# Patient Record
Sex: FEMALE | Race: WHITE
[De-identification: names, ages, dates, MRNs, and addresses within clinical notes are randomized per-mention and may not be internally consistent; named-entity substitution may affect disease eponyms.]

---

## 2017-05-23 ENCOUNTER — HOSPITAL ENCOUNTER (EMERGENCY)
Dept: HOSPITAL 11 - JP.ED | Age: 32
Discharge: HOME | End: 2017-05-23
Payer: MEDICAID

## 2017-05-23 VITALS — SYSTOLIC BLOOD PRESSURE: 128 MMHG | DIASTOLIC BLOOD PRESSURE: 74 MMHG

## 2017-05-23 DIAGNOSIS — Z88.0: ICD-10-CM

## 2017-05-23 DIAGNOSIS — Z88.8: ICD-10-CM

## 2017-05-23 DIAGNOSIS — E10.649: Primary | ICD-10-CM

## 2017-05-23 DIAGNOSIS — Z98.890: ICD-10-CM

## 2017-05-23 DIAGNOSIS — Z79.4: ICD-10-CM

## 2017-05-23 DIAGNOSIS — Z88.1: ICD-10-CM

## 2017-05-23 PROCEDURE — 82962 GLUCOSE BLOOD TEST: CPT

## 2017-05-23 PROCEDURE — 99284 EMERGENCY DEPT VISIT MOD MDM: CPT

## 2017-05-23 PROCEDURE — 96374 THER/PROPH/DIAG INJ IV PUSH: CPT

## 2017-05-23 NOTE — EDM.PDOC
ED HPI GENERAL MEDICAL PROBLEM





- General


Chief Complaint: Diabetic Complaint


Stated Complaint: MEDICAL VIA NORTH


Time Seen by Provider: 17 01:28


Source of Information: Reports: Patient, EMS, Old Records, RN Notes Reviewed


History Limitations: Reports: Other (amnestic for the event)





- History of Present Illness


INITIAL COMMENTS - FREE TEXT/NARRATIVE: 





EMS arrival, EMS was called by her six-year-old son





Chief complaint: Low blood sugar, seizure


HPI: 32-year-old female with type 1 diabetes since aged 10


And became much more difficult to control after her childbirth about 6 years 

ago.


Additionally for a while she was out of insurance she couldn't test her sugars 

she had no strips and then did not have a machine


EMS as been called replaced several times for symptoms of a low blood sugar





Lives with her son and , but  was out of town working as he is 

frequently


Her son knows to call 911 when mom becomes unresponsive





Her son did notify EMS tonight


They report several visits to the home because of low sugars.  However the 

first time the blood sugar registered "low" without giving a number.


A. treated with the usual probe which is glucagon 1 mg IM and oral glucose 

solution.  However while they were there she had a generalized grand mal 

seizure lasting at least 3 minutes.


She does not room for the event





She reports she has had seizures previously


She states that she's become unaware of when she feels that her sugars will


She also states it diabetes become such secondary to to her that sometimes she 

has given himself 2 injections of insulin.  Now she does have a glucose meter 

and has started recording in the last 2 days





She will be followed up by her physician who will adjust her insulin doses 

based on her readings


She currently takes Lantus 30 units at bedtime and 5 units of short-acting 

insulin for carbohydrate equivalent.





Feels well currently.


No recent cough cold infection or fever





- Related Data


 Allergies











Allergy/AdvReac Type Severity Reaction Status Date / Time


 


aspirin Allergy  Hives Verified 17 19:15


 


azithromycin [From Zithromax] Allergy  Anaphylactic Verified 17 19:15





   Shock  


 


cefaclor [From Ceclor] Allergy  Hives Verified 17 19:15


 


Penicillins Allergy  Hives Verified 17 19:15











Home Meds: 


 Home Meds





Insulin Aspart [NovoLOG] 5 unit SUBCUT WITHMEALSANDBED 17 [History]


Insulin Glarg,Human.Rec.Analog [LantUS Solostar] 30 unit SQ BEDTIME 17 [

History]











Past Medical History


HEENT History: Reports: Impaired Vision


OB/GYN History: Reports: Pregnancy, Other (See Below)


Other OB/BYN History: merena IUD


Endocrine/Metabolic History: Reports: Diabetes, Type I





- Infectious Disease History


Infectious Disease History: Reports: Chicken Pox





- Past Surgical History


Female  Surgical History: Reports:  Section





Social & Family History





- Tobacco Use


Smoking Status *Q: Never Smoker





- Caffeine Use


Caffeine Use: Reports: Coffee, Soda





- Recreational Drug Use


Recreational Drug Use: No





ED ROS GENERAL





- Review of Systems


Review Of Systems: See Below


HEENT: Reports: No Symptoms


Respiratory: Reports: No Symptoms


Cardiovascular: Reports: Syncope.  Denies: Chest Pain


Endocrine: Reports: No Symptoms


GI/Abdominal: Reports: No Symptoms


: Reports: No Symptoms


Musculoskeletal: Reports: No Symptoms


Skin: Reports: No Symptoms


Neurological: Reports: Seizure


Psychiatric: Reports: No Symptoms





ED EXAM GENERAL NO PERIP PULSE





- Physical Exam


Exam: See Below


Exam Limited By: No Limitations


General Appearance: Alert, No Apparent Distress, Other (mild tachycardia and 

elevation of systolic blood pressure otherwise appears well)


Eye Exam: Bilateral Eye: Normal Inspection


Ears: Normal External Exam


Nose: Normal Inspection


Throat/Mouth: Normal Oropharynx, Other (there is a bite on the left edge of the 

tongue)


Neck: Normal Inspection.  No: Lymphadenopathy (R), Lymphadenopathy (L)


Respiratory/Chest: No Respiratory Distress, Lungs Clear, No Accessory Muscle Use


Cardiovascular: Normal Peripheral Pulses, Regular Rate, Rhythm, Tachycardia


Neurological: Alert, Oriented, No Motor/Sensory Deficits





Course





- Vital Signs


Last Recorded V/S: 


 Last Vital Signs











Temp  37.1 C   17 01:10


 


Pulse  114 H  17 02:21


 


Resp  18   17 01:10


 


BP  128/74   17 04:46


 


Pulse Ox  93 L  17 02:21














- Orders/Labs/Meds


Orders: 


 Active Orders 24 hr











 Category Date Time Status


 


 Sodium Chloride 0.9% [Saline Flush] Med  17 03:45 Active





 10 ml FLUSH ASDIRECTED PRN   


 


 Saline Lock Insert [OM.PC] Stat Oth  17 03:45 Ordered








 Medication Orders





Sodium Chloride (Saline Flush)  10 ml FLUSH ASDIRECTED PRN


   PRN Reason: Keep Vein Open


   Last Admin: 17 04:43  Dose: 10 ml








Meds: 


Medications











Generic Name Dose Route Start Last Admin





  Trade Name Evin  PRN Reason Stop Dose Admin


 


Sodium Chloride  10 ml  17 03:45  17 04:43





  Saline Flush  FLUSH   10 ml





  ASDIRECTED PRN   Administration





  Keep Vein Open   














Discontinued Medications














Generic Name Dose Route Start Last Admin





  Trade Name Evin  PRN Reason Stop Dose Admin


 


Dextrose/Water  25 ml  17 03:32  17 03:36





  Dextrose 50% In Water  IVPUSH  17 03:33  25 ml





  ONETIME ONE   Administration


 


Dextrose/Water  25 ml  17 04:39  17 04:43





  Dextrose 50% In Water  IVPUSH  17 04:40  25 ml





  ONETIME ONE   Administration














- Re-Assessments/Exams


Free Text/Narrative Re-Assessment/Exam: 





17 01:48


32-year-old female with type 1 diabetes with recurrent hypoglycemic episodes 

recently


Next hyperglycemic episode was accompanied by grand mal seizure


Feels better at the present time


However her initial POC glucose was 66


Will be fed, glucose will be rechecked


17 03:44





03.30 blood sugar remains well below 100 and is trending downwards


below 50, IV started


25 mL D50 W. IV


17 07:02





1/2 amp was repeated at glucose 71





07.00


glucose 83


discharge home


Reduce Lantus To 25 units daily


Followup with primary care as soon as possible








Departure





- Departure


Time of Disposition: 07:14


Disposition: Home, Self-Care 01


Condition: good


Clinical Impression: 


 Hypoglycemia





Diabetes mellitus type 1


Qualifiers:


 Diabetes mellitus complication status: with hypoglycemia Diabetes mellitus 

complication detail: without coma Qualified Code(s): E10.649 - Type 1 diabetes 

mellitus with hypoglycemia without coma








- Discharge Information


Instructions:  Type 1 Diabetes Mellitus, Adult


Referrals: 


PCP,None [Primary Care Provider] - 


Forms:  ED Department Discharge


Additional Instructions: 


Reduce your Lantus from 30 units to 25 units daily


Check your blood sugars more regularly until you find the correct insulin 

dosing. 


You may need to reduce your fast acting insulin as well





Make an appointment with your Dr. if surgery can make sure you keep records of 

your blood sugars





- My Orders


Last 24 Hours: 


My Active Orders





17 03:45


Sodium Chloride 0.9% [Saline Flush]   10 ml FLUSH ASDIRECTED PRN 


Saline Lock Insert [OM.PC] Stat 














- Assessment/Plan


Last 24 Hours: 


My Active Orders





17 03:45


Sodium Chloride 0.9% [Saline Flush]   10 ml FLUSH ASDIRECTED PRN 


Saline Lock Insert [OM.PC] Stat

## 2017-08-15 ENCOUNTER — HOSPITAL ENCOUNTER (EMERGENCY)
Dept: HOSPITAL 11 - JP.ED | Age: 32
Discharge: HOME | End: 2017-08-15
Payer: MEDICAID

## 2017-08-15 VITALS — DIASTOLIC BLOOD PRESSURE: 65 MMHG | SYSTOLIC BLOOD PRESSURE: 133 MMHG

## 2017-08-15 DIAGNOSIS — Z88.1: ICD-10-CM

## 2017-08-15 DIAGNOSIS — Z98.890: ICD-10-CM

## 2017-08-15 DIAGNOSIS — Z88.0: ICD-10-CM

## 2017-08-15 DIAGNOSIS — Z88.8: ICD-10-CM

## 2017-08-15 DIAGNOSIS — E10.649: Primary | ICD-10-CM

## 2017-08-15 DIAGNOSIS — Z79.4: ICD-10-CM

## 2017-08-15 NOTE — EDM.PDOC
ED HPI GENERAL MEDICAL PROBLEM





- General


Chief Complaint: Diabetic Complaint


Stated Complaint: MEDICAL VIA NORTH


Time Seen by Provider: 08/15/17 10:00


Source of Information: Reports: Patient, EMS Notes Reviewed, Other (pt had a 

sugar in he 30s when the ambulance arrived. She was bearly responsive. )


History Limitations: Reports: No Limitations





- History of Present Illness


INITIAL COMMENTS - FREE TEXT/NARRATIVE: 





pt is a type 1 diabetic and had a low sugar this am.  She had a very active day 

yesterday. Before eating super last nite she had a 46 bs. She ate supper and at 

about 7 thirty she did take her usual  Lantus  insulin, 30 units. She thinks 

she should have cut back on the lantus since she had a 46 bs prior to supper.  

She was brought recently one other time with a hypoglycemic episode in the 

middle of the nite. 


Onset: Today


Duration: Hour(s):


Location: Reports: Other (hypoglycemia)


Associated Symptoms: Reports: No Other Symptoms





- Related Data


 Allergies











Allergy/AdvReac Type Severity Reaction Status Date / Time


 


aspirin Allergy  Hives Verified 08/15/17 10:07


 


azithromycin [From Zithromax] Allergy  Anaphylactic Verified 08/15/17 10:07





   Shock  


 


cefaclor [From Ceclor] Allergy  Hives Verified 08/15/17 10:07


 


Penicillins Allergy  Hives Verified 08/15/17 10:07











Home Meds: 


 Home Meds





Insulin Aspart [NovoLOG] 5 unit SUBCUT WITHMEALSANDBED 17 [History]


Insulin Glarg,Human.Rec.Analog [LantUS Solostar] 30 unit SQ BEDTIME 17 [

History]











Past Medical History


HEENT History: Reports: Impaired Vision


OB/GYN History: Reports: Pregnancy, Other (See Below)


Other OB/BYN History: merena IUD


Endocrine/Metabolic History: Reports: Diabetes, Type I





- Infectious Disease History


Infectious Disease History: Reports: Chicken Pox





- Past Surgical History


Female  Surgical History: Reports:  Section





Social & Family History





- Tobacco Use


Smoking Status *Q: Never Smoker





- Caffeine Use


Caffeine Use: Reports: Coffee, Soda





- Recreational Drug Use


Recreational Drug Use: No





ED ROS GENERAL





- Review of Systems


Review Of Systems: See Below


Constitutional: Reports: No Symptoms


HEENT: Reports: No Symptoms


Respiratory: Reports: No Symptoms


Cardiovascular: Reports: No Symptoms


Endocrine: Reports: No Symptoms, Low Glucose


GI/Abdominal: Reports: No Symptoms


: Reports: No Symptoms


Musculoskeletal: Reports: No Symptoms


Skin: Reports: No Symptoms





ED EXAM GENERAL NO PERIP PULSE





- Physical Exam


Exam: See Below


Text/Narrative:: 





pt was found unresponsive by her son this am. She had a low sugar prior to 

having supper last noite. 


Exam Limited By: No Limitations


General Appearance: Alert, Anxious


Ears: Normal TMs


Nose: Normal Inspection


Throat/Mouth: Normal Inspection


Head: Atraumatic


Neck: Normal Inspection


Respiratory/Chest: No Respiratory Distress


Cardiovascular: Regular Rate, Rhythm


GI/Abdominal: Soft, Non-Tender


 (Female) Exam: Deferred


Rectal (Female) Exam: Deferred


Back Exam: Normal Inspection


Extremities: Normal Inspection


Neurological: Alert, Oriented, Normal Cognition





Course





- Vital Signs


Last Recorded V/S: 


 Last Vital Signs











Temp  37.1 C   08/15/17 15:02


 


Pulse  102 H  08/15/17 15:02


 


Resp  16   08/15/17 15:02


 


BP  133/65   08/15/17 15:02


 


Pulse Ox  100   08/15/17 15:02














- Orders/Labs/Meds


Labs: 


 Laboratory Tests











  08/15/17 08/15/17 08/15/17 Range/Units





  10:09 10:09 11:54 


 


WBC  7.8    (4.5-11.0)  K/uL


 


RBC  4.52    (3.30-5.50)  M/uL


 


Hgb  13.4  D    (12.0-15.0)  g/dL


 


Hct  41.5    (36.0-48.0)  %


 


MCV  92    (80-98)  fL


 


MCH  30    (27-31)  pg


 


MCHC  32    (32-36)  %


 


Plt Count  180    (150-400)  K/uL


 


Neut % (Auto)  66    (36-66)  %


 


Lymph % (Auto)  24    (24-44)  %


 


Mono % (Auto)  9 H    (2-6)  %


 


Eos % (Auto)  1 L    (2-4)  %


 


Baso % (Auto)  0    (0-1)  %


 


Sodium   139 L   (140-148)  mmol/L


 


Potassium   3.4 L   (3.6-5.2)  mmol/L


 


Chloride   106   (100-108)  mmol/L


 


Carbon Dioxide   28  D   (21-32)  mmol/L


 


Anion Gap   8.4   (5.0-14.0)  mmol/L


 


BUN   11   (7-18)  mg/dL


 


Creatinine   0.9   (0.6-1.0)  mg/dL


 


Est Cr Clr Drug Dosing   81.50   mL/min


 


Estimated GFR (MDRD)   > 60   (>60)  


 


Glucose   112 H   ()  mg/dL


 


Calcium   8.4 L   (8.5-10.1)  mg/dL


 


Total Bilirubin   0.2  D   (0.2-1.0)  mg/dL


 


AST   18   (15-37)  U/L


 


ALT   27   (12-78)  U/L


 


Alkaline Phosphatase   111   ()  U/L


 


Total Protein   7.3   (6.4-8.2)  g/dL


 


Albumin   3.3 L   (3.4-5.0)  g/dL


 


Globulin   4.0 H   (2.3-3.5)  g/dL


 


Albumin/Globulin Ratio   0.8 L   (1.2-2.2)  


 


Urine Color    Yellow  


 


Urine Appearance    Clear  


 


Urine pH    6.0  (4.5-8.0)  


 


Ur Specific Gravity    1.015  (1.008-1.030)  


 


Urine Protein    Trace  (NEGATIVE)  mg/dL


 


Urine Glucose (UA)    1000 H  (NEGATIVE)  mg/dL


 


Urine Ketones    Negative  (NEGATIVE)  mg/dL


 


Urine Occult Blood    Negative  (NEGATIVE)  


 


Urine Nitrite    Negative  (NEGATIVE)  


 


Urine Bilirubin    Negative  (NEGATIVE)  


 


Urine Urobilinogen    Normal  (NORMAL)  mg/dL


 


Ur Leukocyte Esterase    Large  (NEGATIVE)  


 


Urine RBC    0-5  (0-5)  


 


Urine WBC    0-5  (0-5)  


 


Ur Epithelial Cells    Many  


 


Amorphous Sediment    Not seen  


 


Urine Bacteria    Few  


 


Urine Mucus    Not seen  











Meds: 


Medications














Discontinued Medications














Generic Name Dose Route Start Last Admin





  Trade Name Evin  PRN Reason Stop Dose Admin


 


Albuterol  2.5 mg  08/15/17 12:23  





  Proventil Neb Soln  NEB  08/15/17 12:24  





  ONETIME ONE   


 


Dextrose/Sodium Chloride  1,000 mls @ 200 mls/hr  08/15/17 12:15  08/15/17 12:24





  Dextrose 5%-1/2 Ns  IV   200 mls/hr





  ASDIRECTED FANTASMA   Administration


 


Lorazepam  0.5 mg  08/15/17 13:10  





  Ativan  PO  08/15/17 13:11  





  ONETIME ONE   














- Re-Assessments/Exams


Free Text/Narrative Re-Assessment/Exam: 





08/15/17 12:09


pt arrived after having a hypoglycemic episode at home. Her sugar was 100 on 

arrival. she was fed right away and her sugar still stayed on the low side. She 

has had  2 glasses of oranfge juice with sugar and she is a 69 at this point. 

Will start an iv at 250 and watch her sugars. 


08/15/17 16:04


pt is now maintaining her bs in the 169 range. 





Departure





- Departure


Time of Disposition: 16:04


Disposition: Home, Self-Care 01


Condition: Fair


Clinical Impression: 


 Hypoglycemia, Type 1 diabetes








- Discharge Information


Instructions:  Type 1 Diabetes Mellitus, Adult, Hypoglycemia, Easy-to-Read


Referrals: 


PCP,None [Primary Care Provider] - 


Forms:  ED Department Discharge


Care Plan Goals: 


push fluids, If sugars remain under 200 tonight  give only 10 units of lantus 

tonight. After that cut the Lantus to 20-25 units at nite.  Rtc if further 

problems.  See regular provider in [place] in the next few days.

## 2018-03-08 ENCOUNTER — HOSPITAL ENCOUNTER (EMERGENCY)
Dept: HOSPITAL 11 - JP.ED | Age: 33
Discharge: HOME | End: 2018-03-08
Payer: MEDICAID

## 2018-03-08 VITALS — SYSTOLIC BLOOD PRESSURE: 173 MMHG | DIASTOLIC BLOOD PRESSURE: 94 MMHG

## 2018-03-08 DIAGNOSIS — E11.9: ICD-10-CM

## 2018-03-08 DIAGNOSIS — Z79.4: ICD-10-CM

## 2018-03-08 DIAGNOSIS — Z79.899: ICD-10-CM

## 2018-03-08 DIAGNOSIS — J02.0: Primary | ICD-10-CM

## 2018-03-08 DIAGNOSIS — Z88.1: ICD-10-CM

## 2018-03-08 DIAGNOSIS — Z88.0: ICD-10-CM

## 2018-03-08 DIAGNOSIS — Z88.8: ICD-10-CM

## 2018-03-08 PROCEDURE — 87430 STREP A AG IA: CPT

## 2018-03-08 PROCEDURE — 99283 EMERGENCY DEPT VISIT LOW MDM: CPT

## 2018-03-08 NOTE — EDM.PDOC
ED HPI GENERAL MEDICAL PROBLEM





- General


Chief Complaint: ENT Problem


Stated Complaint: STREP?


Time Seen by Provider: 18 04:49


Source of Information: Reports: Patient, Family, RN Notes Reviewed


History Limitations: Reports: No Limitations





- History of Present Illness


INITIAL COMMENTS - FREE TEXT/NARRATIVE: 





32-year-old female presents emergency department today complaint of sore throat

, she states she's had sore throat for about a day and half as well as fever 

does have a history of recurrent sore throat


  ** Throat


Pain Score (Numeric/FACES): 6





- Related Data


 Allergies











Allergy/AdvReac Type Severity Reaction Status Date / Time


 


aspirin Allergy  Hives Verified 18 04:27


 


azithromycin [From Zithromax] Allergy  Anaphylactic Verified 18 04:27





   Shock  


 


cefaclor [From Ceclor] Allergy  Hives Verified 18 04:27


 


Penicillins Allergy  Hives Verified 18 04:27











Home Meds: 


 Home Meds





Insulin Aspart [NovoLOG] 5 unit SUBCUT WITHMEALSANDBED 17 [History]


Insulin Glarg,Human.Rec.Analog [LantUS Solostar] 60 unit SQ BEDTIME 17 [

History]


Lisinopril [Lisinopril] 1 tab PO DAILY 18 [History]











Past Medical History


HEENT History: Reports: Impaired Vision


OB/GYN History: Reports: Pregnancy, Other (See Below)


Other OB/BYN History: merena IUD


Endocrine/Metabolic History: Reports: Diabetes, Type I





- Infectious Disease History


Infectious Disease History: Reports: Chicken Pox





- Past Surgical History


Female  Surgical History: Reports:  Section





Social & Family History





- Tobacco Use


Smoking Status *Q: Never Smoker


Second Hand Smoke Exposure: No





- Caffeine Use


Caffeine Use: Reports: Coffee, Soda, Tea





- Recreational Drug Use


Recreational Drug Use: No





ED ROS ENT





- Review of Systems


Review Of Systems: See Below


Constitutional: Reports: Fever


HEENT: Reports: Throat Pain, Throat Swelling


Respiratory: Reports: No Symptoms


Cardiovascular: Reports: No Symptoms


GI/Abdominal: Reports: No Symptoms


: Reports: No Symptoms





ED EXAM, ENT





- Physical Exam


Exam: See Below


Exam Limited By: No Limitations


General Appearance: Alert, WD/WN, No Apparent Distress


Nose: Normal Inspection, Normal Mucousa, No Blood


Mouth/Throat: Normal Inspection, Tonsillar Erythema, Tonsillar Exudates


Head: Atraumatic, Normocephalic


Neck: Normal Inspection, Supple, Non-Tender, Full Range of Motion


Respiratory/Chest: No Respiratory Distress





Course





- Vital Signs


Last Recorded V/S: 





 Last Vital Signs











Temp  100.2 F   18 04:24


 


Pulse  119 H  18 04:24


 


Resp  18   18 04:24


 


BP  173/94 H  18 04:24


 


Pulse Ox  96   18 04:24














Departure





- Departure


Time of Disposition: 04:59


Disposition: Home, Self-Care 01


Condition: Good


Clinical Impression: 


 Strep pharyngitis








- Discharge Information


Referrals: 


Jada Orozco MD [Primary Care Provider] - 


Additional Instructions: 


Take antibiotics two tablet every 8 hours for 10 days,  Please followup with 

your primary care provider in 5-7 days if not better, please call return to the 

emergency department with worsening of symptoms.





- Assessment/Plan


Plan: 





Assessment





Acuity = acute





Site and laterality = streptococcal pharyngitis  





Etiology  = group A streptococcus





Manifestations = pharyngitis





Location of injury =  Home





Lab values = positive for group a strep





Plan


Has multiple allergies elected treat with clindamycin 300 mg by mouth every 8 

hours 10 days follow-up with primary care 57 days if no improvement

















 This note was dictated using dragon voice recognition software please call 

with any questions on syntax or karen.